# Patient Record
Sex: FEMALE | Race: WHITE | NOT HISPANIC OR LATINO | URBAN - METROPOLITAN AREA
[De-identification: names, ages, dates, MRNs, and addresses within clinical notes are randomized per-mention and may not be internally consistent; named-entity substitution may affect disease eponyms.]

---

## 2018-07-13 ENCOUNTER — TELEPHONE (OUTPATIENT)
Dept: TRANSPLANT | Facility: CLINIC | Age: 68
End: 2018-07-13

## 2018-07-13 NOTE — TELEPHONE ENCOUNTER
She called into office with confusion on the rule out on [place] for size.  She followed the pop up instruction and saw her PCP who felt that she was healthy enough to proceed.  I reviewed our policy for BMI and stated she will need to lose weight and then she can reconnect with our center.  I answered her questions.

## 2018-08-06 ENCOUNTER — TELEPHONE (OUTPATIENT)
Dept: TRANSPLANT | Facility: CLINIC | Age: 68
End: 2018-08-06

## 2018-08-06 DIAGNOSIS — Z00.5 TRANSPLANT DONOR EVALUATION: Primary | ICD-10-CM

## 2018-08-06 NOTE — TELEPHONE ENCOUNTER
[facility]  [id number]      LIVING KIDNEY DONOR EVALUATION  Donor First Name Kimber Donor MRN    Donor Last Name Jimmy Completed 2018 8:45 AM    1950 Record ID [id number]   BREEZE Screen BMI exceeds 32     Intended Recipient  Recipient First Name Gilbert Recipient MRN    Recipient Last Name Clif Relationship Member of the same community   Recipient   Recipient Diagnosis    Recipient's ABO      Donor Information  Age 67 Gender Female   Ht 155 cm (5' 1'') Race    Wt 77.5 kg (171 lbs) Ethnicity Not /   BMI 32.40 kg/m  Preferred Language English      Required No     Blood Type    Demographics  Home Address  [facility] # [phone]   City  Type Home   State  Alternate #    Zip Code  Type    Country [place] Preferred Contact day    Email [email or web address] Preferred Contact time    Donor's Medical Information  Medical History None Reported Medications None Reported   Surgical History None Reported Allergies NKDA   Social History None Reported Self-Reported Functional Status    Family Medical History None Reported Exercise Frequency    Review of Organ Systems  Review of Systems    Donor's Social Information  Marital Status  Living Accommodation    Level of Education  Living Arrangement    Employment Status  Concerns: health and life insurance    Employer  Concerns: job security and lost income    Occupation      Medical Insurance Status      High Risk Behavior  High Risk Behaviors    Reason for Donation  Referral Friend or Family of Tx Candidate Reason for Donation    Permission to Disclose Inquiry  Patient Comments    Donor Motivation Level      PCP Contact  PCP Name    PCP [facility]    PCP [facility]    PCP Phone    Emergency Contact  First Name  First Name    Last Name  Last Name    Phone #  Phone #    Phone Type  Phone Type    Relationship  Relationship    Office Use  Reviewed By    Reviewed 2018 8:50 AM   Admin Folder Screened Out/Opt Out   Comments  BMI exceeds 32   Lost for Followup    Extended Comments    [facility] ID [email or web address]:[id number] survey status completed

## 2018-08-29 ENCOUNTER — DOCUMENTATION ONLY (OUTPATIENT)
Dept: TRANSPLANT | Facility: CLINIC | Age: 68
End: 2018-08-29

## 2018-08-30 ENCOUNTER — TELEPHONE (OUTPATIENT)
Dept: TRANSPLANT | Facility: CLINIC | Age: 68
End: 2018-08-30